# Patient Record
Sex: FEMALE | Race: WHITE
[De-identification: names, ages, dates, MRNs, and addresses within clinical notes are randomized per-mention and may not be internally consistent; named-entity substitution may affect disease eponyms.]

---

## 2019-01-16 ENCOUNTER — HOSPITAL ENCOUNTER (EMERGENCY)
Dept: HOSPITAL 55 - ED | Age: 59
Discharge: LEFT BEFORE DISCHARGE | DRG: 951 | End: 2019-01-16
Payer: COMMERCIAL

## 2019-01-16 DIAGNOSIS — Z53.8: Primary | ICD-10-CM

## 2019-07-29 ENCOUNTER — HOSPITAL ENCOUNTER (OUTPATIENT)
Dept: HOSPITAL 55 - CONVCARE | Age: 59
Discharge: HOME | End: 2019-07-29
Payer: COMMERCIAL

## 2021-07-15 ENCOUNTER — TRANSFERRED RECORDS (OUTPATIENT)
Dept: HEALTH INFORMATION MANAGEMENT | Facility: CLINIC | Age: 61
End: 2021-07-15

## 2021-07-15 LAB
ALBUMIN (URINE) MG/SPEC: 5 MG/L
ALBUMIN/CREATININE RATIO: 7 MG/G (ref 0–30)
CREATININE (URINE): 74.2 MG/DL
HBA1C MFR BLD: 6.8 %

## 2021-08-18 ENCOUNTER — VIRTUAL VISIT (OUTPATIENT)
Dept: PHARMACY | Facility: CLINIC | Age: 61
End: 2021-08-18
Payer: COMMERCIAL

## 2021-08-18 DIAGNOSIS — F98.8 ATTENTION DEFICIT DISORDER, UNSPECIFIED HYPERACTIVITY PRESENCE: ICD-10-CM

## 2021-08-18 DIAGNOSIS — E11.9 TYPE 2 DIABETES MELLITUS WITHOUT COMPLICATION, WITHOUT LONG-TERM CURRENT USE OF INSULIN (H): Primary | ICD-10-CM

## 2021-08-18 DIAGNOSIS — F32.A DEPRESSION, UNSPECIFIED DEPRESSION TYPE: ICD-10-CM

## 2021-08-18 DIAGNOSIS — I10 ESSENTIAL HYPERTENSION: ICD-10-CM

## 2021-08-18 DIAGNOSIS — G25.81 RESTLESS LEGS SYNDROME (RLS): ICD-10-CM

## 2021-08-18 PROCEDURE — 99607 MTMS BY PHARM ADDL 15 MIN: CPT | Performed by: PHARMACIST

## 2021-08-18 PROCEDURE — 99605 MTMS BY PHARM NP 15 MIN: CPT | Performed by: PHARMACIST

## 2021-08-18 RX ORDER — ROPINIROLE 0.5 MG/1
3 TABLET, FILM COATED ORAL AT BEDTIME
COMMUNITY
Start: 2021-07-07

## 2021-08-18 RX ORDER — LISINOPRIL 10 MG/1
10 TABLET ORAL DAILY
COMMUNITY
Start: 2020-12-04 | End: 2021-12-04

## 2021-08-18 RX ORDER — BUPROPION HYDROCHLORIDE 300 MG/1
300 TABLET ORAL DAILY
COMMUNITY
Start: 2020-12-04 | End: 2021-12-04

## 2021-08-18 RX ORDER — DEXTROAMPHETAMINE SACCHARATE, AMPHETAMINE ASPARTATE, DEXTROAMPHETAMINE SULFATE AND AMPHETAMINE SULFATE 7.5; 7.5; 7.5; 7.5 MG/1; MG/1; MG/1; MG/1
30 TABLET ORAL EVERY MORNING
COMMUNITY
Start: 2019-11-18

## 2021-08-18 RX ORDER — VENLAFAXINE HYDROCHLORIDE 75 MG/1
225 CAPSULE, EXTENDED RELEASE ORAL DAILY
COMMUNITY
Start: 2021-06-03 | End: 2022-06-03

## 2021-08-18 RX ORDER — METFORMIN HCL 500 MG
1000 TABLET, EXTENDED RELEASE 24 HR ORAL DAILY
COMMUNITY
Start: 2020-12-07 | End: 2021-12-07

## 2021-08-18 RX ORDER — METOPROLOL TARTRATE 100 MG
100 TABLET ORAL DAILY
COMMUNITY
Start: 2020-12-04 | End: 2021-12-04

## 2021-08-18 NOTE — PROGRESS NOTES
"Medication Therapy Management (MTM) Encounter    ASSESSMENT:                            Medication Adherence/Access: No issues identified    Type 2 Diabetes: Patient is meeting A1c goal of < 7%. Self monitoring of blood glucose is not at goal of fasting  mg/dL.  I agree that a GLP1 RA is a good option for her to help with weight loss and blood sugar control; Trulicity and Ozempic do have coupons which may help bring down price.  She's not on aspirin 81mg daily, according to ADA 2021 Standards of Care, this would be indicated for her.  She's also not on statin therapy - I do not see that lipids have been checked in the last few years, may benefit from doing so.    Hypertension: Patient is meeting blood pressure goal of < 140/90mmHg, but metoprolol tartrate should be dosed twice daily.  May benefit from changing to succinate; or changing to ACE/ARB therapy as beta-blockers can contribute to weight gain and hyperglycemia.    ADD: Stable per her report.    Depression: Plan in place.    RLS:  Improved.     PLAN:                            1.  Could consider use of Trulicity or Ozempic with use of coupon card.  2.  Could consider aspirin 81mg daily.  3.  Could consider re-checking lipid panel.  4.  Could consider changing metoprolol to ACE/ARB or changing to metoprolol succinate 100mg daily.    Follow-up: Return in about 3 months (around 11/18/2021) for Follow-up Medication Review.    SUBJECTIVE/OBJECTIVE:                          Jennifer Mathew is a 61 year old female called for an initial visit. She was referred to me from her insurance plan.  Her primary care physician is Dr. Espinoza Ruff ([facility] in [place]).  She also sees Steffany Moralez    Reason for visit: Comprehensive medication review.    Allergies/ADRs: Reviewed in chart  Past Medical History: Per patient - HTN, sarcoidosis (after having children, \"now dormant\"), T2DM, ADD, obesity.  Had gall bladder removed in 2019, h/o 5 c-sections, " bilateral knee replacements.  Tobacco: She reports that she has never smoked. She has never used smokeless tobacco.  Alcohol: Less than 1 beverage / month  Caffeine: 4 cans of diet coke/day  Activity: She exercises 3-4 days/week (she has a  she works with) - she's been doing this for 7 years.  This is a combination of strength training and cardio (cardio has recently been increased).    Medication Adherence/Access: Patient uses pill box(es).  Patient takes medications 1 time(s) per day.  Per patient, misses medication 0 times per week - she does feel well when she misses a dose so she does not ever miss (sometimes takes a bit later than normal).  The patient fills medications at [place]: NO, fills medications at [facility] in [place], [place].    Type 2 Diabetes:  She reports progressing from pre-diabetes to T2DM about 2 months ago.  Currently taking metformin XR 1000mg daily. Patient is not experiencing side effects.  She was prescribed Victoza, but this was stopped due to cost (was >$690 for 1 month)  Blood sugar monitorin time(s) daily (AM fasting). Ranges (patient reported): Fasting- 130-150mg/dL since stopping Victoza about 2 weeks ago.  She does not feel metformin is doing anything for her blood sugars  Symptoms of low blood sugar? none  Symptoms of high blood sugar? Fatigue, polyuria, polydipsia, facial flushing  Eye exam: due - has scheduled in September   Foot exam: due  Diet/Exercise: Home weight reported 260lbs, which is the highest she's ever been.  She is also working with a dietician through her gym (they meet once a week).  She's frustrated that she feels she's doing everything right yet continues to gain weight.  Aspirin: No - has never been recommended.  Statin: No   ACEi/ARB: Yes: lisinopril 10mg.  Following results are found via Care Everywhere (lipids not available)           Hypertension: Current medications include lisinopril 10mg daily and metoprolol tartrate 100mg daily.   "Patient does not self-monitor blood pressure.  Patient reports no current medication side effects.  Last blood pressure via care everywhere - 125/83mmHg on 7/15/21     ADD:  She's taking Adderal IR 30mg every morning and a 2nd dose during the day if needed.  She finds this very effective for staying on task, etc.  She says \"I tend to sit and stare, and I find the Adderall very effective for this.\"  She denies side effects.    Depression:  Current medications include: bupropion XL 300mg daily and venlafaxine XR 225mg daily (dose recently increased).  She feels depression is stable. She sees a therapist every other week.  She denies side effects.  She denies suicidal ideation.     RLS:  She's recently been diagnosed with RLS and has started ropinirole 0.5mg - 3 tablets at bedtime.  She has found this effective.  No side effects reported.    Today's Vitals: There were no vitals taken for this visit.  ----------------    I spent 42 minutes with this patient today. I offer these suggestions with the understanding that I don't fully understand Jennifer Miles's past medical history and the complexity of her health conditions. Jennifer Miles should make no changes without the approval of her physician. A copy of the visit note was provided to the patient's primary care provider.    The patient was mailed a summary of these recommendations.     Bonita Graff, PharmD, [facility]  Medication Therapy Management Provider  Pager: [phone]     Telemedicine Visit Details  Type of service:  Telephone visit  Start Time: 10:01 AM  End Time: 10:43 AM  Originating Location (patient location): Home  Distant Location (provider location):  [facility]     Medication Therapy Recommendations  Essential hypertension    Current Medication: metoprolol tartrate (LOPRESSOR) 100 MG tablet   Rationale: More effective medication available - Ineffective medication - Effectiveness   Recommendation: Change Medication - lisinopril 20 MG tablet "   Status: Contact Provider - Awaiting Response         Type 2 diabetes mellitus without complication, without long-term current use of insulin (H)    Current Medication: metFORMIN (GLUCOPHAGE-XR) 500 MG 24 hr tablet   Rationale: Synergistic therapy - Needs additional medication therapy - Indication   Recommendation: Start Medication - Ozempic (0.25 or 0.5 MG/DOSE) 2 MG/1.5ML Sopn   Status: Contact Provider - Awaiting Response          Rationale: Preventive therapy - Needs additional medication therapy - Indication   Recommendation: Start Medication - aspirin 81 MG Tbec   Status: Contact Provider - Awaiting Response

## 2021-08-20 NOTE — PATIENT INSTRUCTIONS
Recommendations from today's MTM visit:                                                    MTM (medication therapy management) is a service provided by a clinical pharmacist designed to help you get the most of out of your medicines.   Today we reviewed what your medicines are for, how to know if they are working, that your medicines are safe and how to make your medicine regimen as easy as possible.      Please talk with your care team regarding the following recommendations:    1.  Could consider use of Trulicity or Ozempic with use of coupon card.  2.  Could consider aspirin 81mg daily.  3.  Could consider re-checking cholesterol.  4.  Could consider changing metoprolol to lisinopril or changing to metoprolol succinate 100mg daily.    Follow-up: Return in about 3 months (around 11/18/2021) for Follow-up Medication Review.    It was great to speak with you today.  I value your experience and would be very thankful for your time with providing feedback on our clinic survey. You may receive a survey via email or text message in the next few days.     To schedule another MTM appointment, please call the clinic directly or you may call the MTM scheduling line at [phone] or toll-free at [phone].     My Clinical Pharmacist's contact information:                                                      Please feel free to contact me with any questions or concerns you have.      Bonita Graff PharmD, [facility]  Medication Therapy Management Provider  Pager: [phone]     Mariajose Villegas, Elidia  Medication Therapy Management Resident  Pager: [phone]

## 2022-01-19 ENCOUNTER — TELEPHONE (OUTPATIENT)
Dept: PHARMACY | Facility: CLINIC | Age: 62
End: 2022-01-19
Payer: COMMERCIAL

## 2022-01-19 NOTE — TELEPHONE ENCOUNTER
I called and left a message regarding scheduling a follow-up appointment with the MTM team. I left the phone number [phone] to call back and schedule.     Yasir Guzman, PharmD  [facility] Resident

## 2022-03-01 ENCOUNTER — TELEPHONE (OUTPATIENT)
Dept: PHARMACY | Facility: CLINIC | Age: 62
End: 2022-03-01
Payer: COMMERCIAL

## 2022-03-01 NOTE — TELEPHONE ENCOUNTER
We have been unable to reach this patient for MTM follow-up after several attempts. We will stop reaching out to the patient at this time. Please let us know if we can assist in this patient's care in the future.    Bonita Graff PharmD, [facility]  Medication Therapy Management Provider  Pager: [phone]